# Patient Record
Sex: FEMALE | Race: WHITE | Employment: UNEMPLOYED | URBAN - METROPOLITAN AREA
[De-identification: names, ages, dates, MRNs, and addresses within clinical notes are randomized per-mention and may not be internally consistent; named-entity substitution may affect disease eponyms.]

---

## 2018-07-27 LAB — HBA1C MFR BLD HPLC: 6 %

## 2019-03-26 LAB
CREATININE, EXTERNAL: 0.79
LDL-C, EXTERNAL: 108

## 2019-05-10 ENCOUNTER — TELEPHONE (OUTPATIENT)
Dept: ENDOCRINOLOGY | Age: 49
End: 2019-05-10

## 2019-05-10 NOTE — TELEPHONE ENCOUNTER
Patient called wanting asap appointment not seen since August of 2016. She would be considered follow up until August. And no open appointments. She was upset and wants a phone call to discuss soon appointment. States Dr. Dominguez Padilla is pcp and taking care of Thyroid. Just increased to 88mcg a month ago and still tired.

## 2019-05-15 ENCOUNTER — OFFICE VISIT (OUTPATIENT)
Dept: ENDOCRINOLOGY | Age: 49
End: 2019-05-15

## 2019-05-15 VITALS
HEART RATE: 86 BPM | DIASTOLIC BLOOD PRESSURE: 76 MMHG | OXYGEN SATURATION: 99 % | RESPIRATION RATE: 18 BRPM | WEIGHT: 225.4 LBS | TEMPERATURE: 97.3 F | SYSTOLIC BLOOD PRESSURE: 114 MMHG | BODY MASS INDEX: 38.48 KG/M2 | HEIGHT: 64 IN

## 2019-05-15 DIAGNOSIS — Z78.0 MENOPAUSE: ICD-10-CM

## 2019-05-15 DIAGNOSIS — E06.3 HASHIMOTO'S DISEASE: Primary | ICD-10-CM

## 2019-05-15 RX ORDER — LEVOTHYROXINE SODIUM 88 UG/1
TABLET ORAL
Refills: 1 | COMMUNITY
Start: 2019-04-28 | End: 2019-05-15 | Stop reason: SDUPTHER

## 2019-05-15 RX ORDER — IBUPROFEN 200 MG
200 TABLET ORAL
COMMUNITY

## 2019-05-15 RX ORDER — LEVOTHYROXINE SODIUM 88 UG/1
TABLET ORAL
Qty: 90 TAB | Refills: 4 | Status: SHIPPED | OUTPATIENT
Start: 2019-05-15 | End: 2020-03-10 | Stop reason: SDUPTHER

## 2019-05-15 NOTE — PROGRESS NOTES
HISTORY OF PRESENT ILLNESS  Ora Hernandez is a 52 y.o. female. HPI    Patient is here for hypothyroidism management and   Help for weight loss       She has seen me before   Pt never  Followed up after initial visit  From  August 2016         Initial visit for low TSH   TSH  0.08 from aug 19 2016      Referred by [facility]      patient reports going thru some changes for the past 2 months   She has felt palpitations    She changed care to Dr. Roberto Ruano in the interim for primary care needs   She was found to have TSH  19 and got started on  Synthroid - her information     She is very emotional, crying at intervals while providing the history     She is very upset about gaining weight and also feeling very tired     She feels that THYROID IS THE CULPRIT for all her symptoms and she is not getting help           Past Medical History:   Diagnosis Date    Allergic rhinitis     Anxiety     Depression     Vitamin D deficiency        Social History     Socioeconomic History    Marital status:      Spouse name: Not on file    Number of children: Not on file    Years of education: Not on file    Highest education level: Not on file   Occupational History    Not on file   Social Needs    Financial resource strain: Not on file    Food insecurity:     Worry: Not on file     Inability: Not on file    Transportation needs:     Medical: Not on file     Non-medical: Not on file   Tobacco Use    Smoking status: Current Every Day Smoker    Smokeless tobacco: Never Used   Substance and Sexual Activity    Alcohol use:  Yes    Drug use: Not on file    Sexual activity: Not on file   Lifestyle    Physical activity:     Days per week: Not on file     Minutes per session: Not on file    Stress: Not on file   Relationships    Social connections:     Talks on phone: Not on file     Gets together: Not on file     Attends Evangelical service: Not on file     Active member of club or organization: Not on file Attends meetings of clubs or organizations: Not on file     Relationship status: Not on file    Intimate partner violence:     Fear of current or ex partner: Not on file     Emotionally abused: Not on file     Physically abused: Not on file     Forced sexual activity: Not on file   Other Topics Concern    Not on file   Social History Narrative    Not on file       Family History   Problem Relation Age of Onset    Hypertension Mother     Hypertension Father                  Review of Systems   Constitutional: Positive for diaphoresis and malaise/fatigue. HENT: Negative. Eyes: Negative for pain and redness. Respiratory: Negative. Cardiovascular: Positive for palpitations. Negative for chest pain and leg swelling. Gastrointestinal: Negative. Negative for constipation. Genitourinary: Negative. Musculoskeletal: Negative for myalgias. Skin: Negative. Neurological: Negative. Endo/Heme/Allergies: Negative. Psychiatric/Behavioral: Negative for depression and memory loss. The patient does not have insomnia.          Physical Exam   Constitutional: She is oriented to person, place, and time. She appears well-developed and well-nourished. HENT:   Head: Normocephalic. Eyes: Conjunctivae and EOM are normal. Pupils are equal, round, and reactive to light. Neck: Normal range of motion. Neck supple. No JVD present. No tracheal deviation present. No thyromegaly present. Cardiovascular: Normal rate, regular rhythm and normal heart sounds. No murmur heard. Pulmonary/Chest: Breath sounds normal.   Abdominal: Soft. Bowel sounds are normal.   Musculoskeletal: Normal range of motion. Lymphadenopathy:     She has no cervical adenopathy. Neurological: She is alert and oriented to person, place, and time. She has normal reflexes. Skin: Skin is warm.    Psychiatric: She has CRYING SPELLS .      REVIEWED LABS FROM [facility] [place] FOR THE PAST 3 YEARS        ASSESSMENT and PLAN    1. hashimatos disease :   TSH is 2.2   From may 14 2019   Discussed patho-physiology  Of the disease     Her dose has been well  Adjusted by pcp  from 75 mcg dose to 88 mcg dose when TSH was 7.3   In march 2019   Asking her to be on BRAND synthrodi 88 mcg dose     She is bio-chemically EUTHYROID TODAY AT 2.280   SHE IS CLINICALLY EUTHYROID         2. Weight gain / Body mass index is 38.69 kg/m². DISCUSSED  TLC   Reassured  her that I will help her   Educated her to maintain carb and calorie counts         3. Amenorrhea : on IUD for 2 years   Menopause to be ruled out        4.  Pre diabetes :5.9 % on labs  Discussed TLC       > 50 % of time is spent on counseling   Patient voiced understanding her plan of care

## 2019-05-15 NOTE — PROGRESS NOTES
1. Have you been to the ER, urgent care clinic since your last visit? No  Hospitalized since your last visit? No    2. Have you seen or consulted any other health care providers outside of the [address] [place] [address] since your last visit? Include any pap smears or colon screening.  No     Wt Readings from Last 3 Encounters:   05/15/19 225 lb 6.4 oz (102.2 kg)   08/23/16 194 lb (88 kg)     Temp Readings from Last 3 Encounters:   05/15/19 97.3 °F (36.3 °C) (Oral)   08/23/16 97.3 °F (36.3 °C) (Oral)     BP Readings from Last 3 Encounters:   05/15/19 114/76   08/23/16 112/73     Pulse Readings from Last 3 Encounters:   05/15/19 86   08/23/16 81

## 2019-05-15 NOTE — LETTER
5/17/19 Patient: Willie Elise YOB: 1970 Date of Visit: 5/15/2019 Candelaria Campbell MD 
[address] [facility] [address] VIA Facsimile: [phone] Dear Candelaria Campbell MD, Thank you for referring Ms. Willie Elise to [facility] for evaluation. My notes for this consultation are attached. If you have questions, please do not hesitate to call me. I look forward to following your patient along with you. Sincerely, Lesli Martinez MD

## 2019-05-15 NOTE — PATIENT INSTRUCTIONS
--------------------------------------------------------------------------------------------    Refills    -    please call your pharmacy and have them send us a refill request    Results  -  allow up to a week for lab results to be processed and reviewed. Phone calls  -  Allow upto 24 hrs.  for non-urgent calls to be retained    Prior authorization - It may take up to 4 weeks to process, depending on your insurance    Forms  -  FMLA, DMV, patient assistance, etc. will take up to 2 weeks to process    Cancellations - please notify the office in advance if you cannot keep your appointment    Samples  - will only be dispensed at visits as supply is limited      If you are having a medical emergency call 911    --------------------------------------------------------------------------------------------        Low carb diet     No sodas   , salt restricted diet       Count the carbs  And calories      ------------------------------------------------------------------------------------------------------------------------------------    Synthroid 88 mcg  A day, BRAND  on empty stomach with water only, no other meds or food or drinks   For next half hour  ( [facility] )    Take any kind of vitamins, calcium, iron   Pills  4 hours later

## 2019-07-15 ENCOUNTER — OFFICE VISIT (OUTPATIENT)
Dept: ENDOCRINOLOGY | Age: 49
End: 2019-07-15

## 2019-07-15 VITALS
DIASTOLIC BLOOD PRESSURE: 79 MMHG | RESPIRATION RATE: 12 BRPM | SYSTOLIC BLOOD PRESSURE: 112 MMHG | HEIGHT: 64 IN | BODY MASS INDEX: 38.07 KG/M2 | TEMPERATURE: 97.6 F | WEIGHT: 223 LBS | HEART RATE: 104 BPM

## 2019-07-15 DIAGNOSIS — E06.3 HASHIMOTO'S DISEASE: Primary | ICD-10-CM

## 2019-07-15 RX ORDER — DULOXETINE 40 MG/1
CAPSULE, DELAYED RELEASE ORAL
Refills: 3 | COMMUNITY
Start: 2019-06-12 | End: 2019-11-22 | Stop reason: ALTCHOICE

## 2019-07-15 NOTE — PROGRESS NOTES
HISTORY OF PRESENT ILLNESS  Farida Veronica is a 52 y.o. female. HPI    Patient is here for hypothyroidism management and   Help for weight loss   From May 2019     Expresses interest in weight loss medication    She lost all her journal where she has put in food log   Compliant  With synthroid a day       She feels good on duloxetine           Old history  :      She has seen me before   Pt never  Followed up after initial visit  From  August 2016         Old history :     Initial visit for low TSH   TSH  0.08 from aug 19 2016      Referred by [facility]      patient reports going thru some changes for the past 2 months   She has felt palpitations    She changed care to Dr. Alondra Vo in the interim for primary care needs   She was found to have TSH  19 and got started on  Synthroid - her information     She is very emotional, crying at intervals while providing the history     She is very upset about gaining weight and also feeling very tired     She feels that THYROID IS THE CULPRIT for all her symptoms and she is not getting help           Past Medical History:   Diagnosis Date    Allergic rhinitis     Anxiety     Depression     Vitamin D deficiency        Social History     Socioeconomic History    Marital status:      Spouse name: Not on file    Number of children: Not on file    Years of education: Not on file    Highest education level: Not on file   Occupational History    Not on file   Social Needs    Financial resource strain: Not on file    Food insecurity:     Worry: Not on file     Inability: Not on file    Transportation needs:     Medical: Not on file     Non-medical: Not on file   Tobacco Use    Smoking status: Current Every Day Smoker    Smokeless tobacco: Never Used   Substance and Sexual Activity    Alcohol use:  Yes    Drug use: Not on file    Sexual activity: Not on file   Lifestyle    Physical activity:     Days per week: Not on file     Minutes per session: Not on file  Stress: Not on file   Relationships    Social connections:     Talks on phone: Not on file     Gets together: Not on file     Attends Gnosticist service: Not on file     Active member of club or organization: Not on file     Attends meetings of clubs or organizations: Not on file     Relationship status: Not on file    Intimate partner violence:     Fear of current or ex partner: Not on file     Emotionally abused: Not on file     Physically abused: Not on file     Forced sexual activity: Not on file   Other Topics Concern    Not on file   Social History Narrative    Not on file       Family History   Problem Relation Age of Onset    Hypertension Mother     Hypertension Father                  Review of Systems   Constitutional: nad  HENT: Negative. Eyes: Negative for pain and redness. Respiratory: Negative. Cardiovascular:  Negative for chest pain and leg swelling. Gastrointestinal: Negative. Negative for constipation. Genitourinary: Negative. Musculoskeletal: Negative for myalgias. Skin: Negative. Neurological: Negative. Endo/Heme/Allergies: Negative. Psychiatric/Behavioral: Negative for depression and memory loss. The patient does not have insomnia.          Physical Exam   Constitutional: She is oriented to person, place, and time. She appears well-developed and well-nourished. HENT:   Head: Normocephalic. Eyes: Conjunctivae and EOM are normal. Pupils are equal, round, and reactive to light. Neck: Normal range of motion. Neck supple. No JVD present. No tracheal deviation present. No thyromegaly present. Cardiovascular: Normal rate, regular rhythm and normal heart sounds. No murmur heard. Pulmonary/Chest: Breath sounds normal.   Abdominal: Soft. Bowel sounds are normal.   Musculoskeletal: Normal range of motion. Lymphadenopathy:     She has no cervical adenopathy. Neurological: She is alert and oriented to person, place, and time. She has normal reflexes. Skin: Skin is warm. Psychiatric: She has CRYING SPELLS .            REVIEWED LABS FROM [place] FOR THE PAST 3 YEARS   Feb 2019 : TSH was 7   May 2019 : TSH 2           ASSESSMENT and PLAN    1. hashimatos disease  :   TSH is 2.2   From may 14 2019   Discussed patho-physiology  Of the disease     Her dose has been well  Adjusted by pcp  from 75 mcg dose to 88 mcg dose when TSH was 7.3   In march 2019   Asking her to be on BRAND synthrodi 88 mcg dose     She is bio-chemically EUTHYROID TODAY AT 2.280   SHE IS CLINICALLY EUTHYROID         2. Weight gain / Body mass index is 38.28 kg/m². DISCUSSED  TLC   Educated her to maintain carb and calorie counts   Discussed use of weight loss meds especially Q SYmia  She will call her plan and she will let me know which one is covered          3.  Amenorrhea : on IUD for 2 years   Menopause to be ruled out , LABS PENDING        4. Pre diabetes :5.9 % on labs  Discussed TLC       > 50 % of time is spent on counseling   Patient voiced understanding her plan of care

## 2019-07-15 NOTE — PROGRESS NOTES
Wt Readings from Last 3 Encounters:   07/15/19 223 lb (101.2 kg)   05/15/19 225 lb 6.4 oz (102.2 kg)   08/23/16 194 lb (88 kg)     Temp Readings from Last 3 Encounters:   07/15/19 97.6 °F (36.4 °C) (Oral)   05/15/19 97.3 °F (36.3 °C) (Oral)   08/23/16 97.3 °F (36.3 °C) (Oral)     BP Readings from Last 3 Encounters:   07/15/19 112/79   05/15/19 114/76   08/23/16 112/73     Pulse Readings from Last 3 Encounters:   07/15/19 (!) 104   05/15/19 86   08/23/16 81

## 2019-07-15 NOTE — LETTER
7/15/19 Patient: Juana Merida YOB: 1970 Date of Visit: 7/15/2019 Yaron Carmona MD 
[address] [facility] [address] VIA Facsimile: [phone] Dear Yaron Carmona MD, Thank you for referring Ms. Juana Merida to [facility] for evaluation. My notes for this consultation are attached. If you have questions, please do not hesitate to call me. I look forward to following your patient along with you. Sincerely, Garcia Underwood MD

## 2019-07-15 NOTE — PATIENT INSTRUCTIONS
--------------------------------------------------------------------------------------------    Refills    -    please call your pharmacy and have them send us a refill request    Results  -  allow up to a week for lab results to be processed and reviewed. Phone calls  -  Allow upto 24 hrs.  for non-urgent calls to be retained    Prior authorization - It may take up to 4 weeks to process, depending on your insurance    Forms  -  FMLA, DMV, patient assistance, etc. will take up to 2 weeks to process    Cancellations - please notify the office in advance if you cannot keep your appointment    Samples  - will only be dispensed at visits as supply is limited      If you are having a medical emergency call 911    --------------------------------------------------------------------------------------------        Low carb diet     No sodas   , salt restricted diet       Count the carbs  And calories      ------------------------------------------------------------------------------------------------------------------------------------    Synthroid 88 mcg  A day, BRAND  on empty stomach with water only, no other meds or food or drinks   For next half hour  ( [facility] )    Take any kind of vitamins, calcium, iron   Pills  4 hours later      -------------------------------------------------------------------------------------------------------------------------------------------------------------------------        Start on  Q-symia    Once   A   Day      7.25 - 46 mg a day  ( will send to [facility] )         -------------------------------------------------------------------------------------------------------      belviq  xr       Or       contrave

## 2019-08-01 LAB
CREATININE, EXTERNAL: 0.65
HBA1C MFR BLD HPLC: 5.7 %

## 2019-08-05 ENCOUNTER — TELEPHONE (OUTPATIENT)
Dept: ENDOCRINOLOGY | Age: 49
End: 2019-08-05

## 2019-08-05 NOTE — TELEPHONE ENCOUNTER
alma love and informed patient of dr Orion Mares note pt states she is feeling fine and would like to continue 88mcg dose-ok per dr Orion Mares to [facility] that dose.  Pt transfer to front to make appot for labs

## 2019-08-05 NOTE — TELEPHONE ENCOUNTER
Patient states Dr. Layne Cohen wants to increase thyroid medication. States Dr. Layne Cohen faxed over recent labs for physician to review. Please advise patient if physician is okay with change.

## 2019-08-05 NOTE — TELEPHONE ENCOUNTER
July 31 2019      TSH from pcp labs was 4.7        TSh was perfectly normal in may 2019  Labs 2.5       No doubt, the hashimotos can progress, but this small change in TSH is believed to be from just the absorption and intake causes than the disease itself     I am not 100 % convinced yet, that she needs higher than 88 mcg dose   I would like to repeat in a while     Anyway, dose adjustment is to 100 mcg a day   And if that is a bit too much, she needs to move back down to 88       Either way I am fine  , let me know     But in general, I do not like going back and forth . Lord Locke ...

## 2019-11-08 ENCOUNTER — TELEPHONE (OUTPATIENT)
Dept: ENDOCRINOLOGY | Age: 49
End: 2019-11-08

## 2019-11-08 DIAGNOSIS — E06.3 HASHIMOTO'S DISEASE: Primary | ICD-10-CM

## 2019-11-08 DIAGNOSIS — Z78.0 MENOPAUSE: ICD-10-CM

## 2019-11-15 ENCOUNTER — HOSPITAL ENCOUNTER (OUTPATIENT)
Dept: LAB | Age: 49
Discharge: HOME | End: 2019-11-15

## 2019-11-15 ENCOUNTER — LAB ONLY (OUTPATIENT)
Dept: ENDOCRINOLOGY | Age: 49
End: 2019-11-15

## 2019-11-15 DIAGNOSIS — E06.3 HASHIMOTO'S DISEASE: Primary | ICD-10-CM

## 2019-11-15 DIAGNOSIS — E06.3 HASHIMOTO'S DISEASE: ICD-10-CM

## 2019-11-15 DIAGNOSIS — Z78.0 MENOPAUSE: ICD-10-CM

## 2019-11-15 LAB
ALBUMIN SERPL-MCNC: 4 G/DL (ref 3.5–5)
ALBUMIN/GLOB SERPL: 1.1 {RATIO} (ref 1.1–2.2)
ALP SERPL-CCNC: 62 U/L (ref 45–117)
ALT SERPL-CCNC: 26 U/L (ref 12–78)
ANION GAP SERPL CALC-SCNC: 6 MMOL/L (ref 5–15)
AST SERPL-CCNC: 10 U/L (ref 15–37)
BILIRUB SERPL-MCNC: 0.4 MG/DL (ref 0.2–1)
BUN SERPL-MCNC: 8 MG/DL (ref 6–20)
BUN/CREAT SERPL: 11 (ref 12–20)
CALCIUM SERPL-MCNC: 9.3 MG/DL (ref 8.5–10.1)
CHLORIDE SERPL-SCNC: 106 MMOL/L (ref 97–108)
CHOLEST SERPL-MCNC: 192 MG/DL
CO2 SERPL-SCNC: 28 MMOL/L (ref 21–32)
CREAT SERPL-MCNC: 0.75 MG/DL (ref 0.55–1.02)
EST. AVERAGE GLUCOSE BLD GHB EST-MCNC: 128 MG/DL
FSH SERPL-ACNC: 46.6 MIU/ML
GLOBULIN SER CALC-MCNC: 3.5 G/DL (ref 2–4)
GLUCOSE SERPL-MCNC: 102 MG/DL (ref 65–100)
HBA1C MFR BLD: 6.1 % (ref 4–5.6)
HDLC SERPL-MCNC: 48 MG/DL
HDLC SERPL: 4 {RATIO} (ref 0–5)
LDLC SERPL CALC-MCNC: 123.4 MG/DL (ref 0–100)
LH SERPL-ACNC: 20.6 MIU/ML
LIPID PROFILE,FLP: ABNORMAL
POTASSIUM SERPL-SCNC: 4.3 MMOL/L (ref 3.5–5.1)
PROT SERPL-MCNC: 7.5 G/DL (ref 6.4–8.2)
SODIUM SERPL-SCNC: 140 MMOL/L (ref 136–145)
T4 FREE SERPL-MCNC: 1.2 NG/DL (ref 0.8–1.5)
TRIGL SERPL-MCNC: 103 MG/DL (ref ?–150)
TSH SERPL DL<=0.05 MIU/L-ACNC: 1.12 UIU/ML (ref 0.36–3.74)
VLDLC SERPL CALC-MCNC: 20.6 MG/DL

## 2019-11-22 ENCOUNTER — OFFICE VISIT (OUTPATIENT)
Dept: ENDOCRINOLOGY | Age: 49
End: 2019-11-22

## 2019-11-22 VITALS
OXYGEN SATURATION: 99 % | DIASTOLIC BLOOD PRESSURE: 79 MMHG | HEIGHT: 64 IN | RESPIRATION RATE: 18 BRPM | SYSTOLIC BLOOD PRESSURE: 116 MMHG | BODY MASS INDEX: 37.98 KG/M2 | HEART RATE: 85 BPM | TEMPERATURE: 98.8 F | WEIGHT: 222.5 LBS

## 2019-11-22 DIAGNOSIS — Z78.0 MENOPAUSE: ICD-10-CM

## 2019-11-22 DIAGNOSIS — E06.3 HASHIMOTO'S DISEASE: Primary | ICD-10-CM

## 2019-11-22 RX ORDER — LIOTHYRONINE SODIUM 5 UG/1
5 TABLET ORAL DAILY
Qty: 90 TAB | Refills: 4 | Status: SHIPPED | OUTPATIENT
Start: 2019-11-22 | End: 2021-02-05 | Stop reason: SDUPTHER

## 2019-11-22 NOTE — LETTER
11/23/19 Patient: Luiz Bernard YOB: 1970 Date of Visit: 11/22/2019 Eduardo Alcantar MD 
[address] [facility] [address] VIA Facsimile: [phone] Dear Eduardo Alcantar MD, Thank you for referring Ms. Luiz Bernard to [facility] for evaluation. My notes for this consultation are attached. If you have questions, please do not hesitate to call me. I look forward to following your patient along with you. Sincerely, Ron Davila MD

## 2019-11-22 NOTE — PROGRESS NOTES
HISTORY OF PRESENT ILLNESS  Darylene Running is a 52 y.o. female. HPI    Patient is here for  Follow up for hypothyroidism management and  Weight loss   After last visit   July 2019     DOES NOT WANT TO TRY  DIET PILLS   SHE HAS COLD INTOLERANCE AND FEELS TIRED BY 3-4 PM       Old history  :    Expresses interest in weight loss medication  She lost all her journal where she has put in food log   Compliant  With synthroid a day   She feels good on duloxetine       Old history  :    She has seen me before   Pt never  Followed up after initial visit  From  August 2016         Old history :     Initial visit for low TSH   TSH  0.08 from aug 19 2016      Referred by [facility]      patient reports going thru some changes for the past 2 months   She has felt palpitations    She changed care to Dr. Laura Stokes in the interim for primary care needs   She was found to have TSH  19 and got started on  Synthroid - her information     She is very emotional, crying at intervals while providing the history     She is very upset about gaining weight and also feeling very tired     She feels that THYROID IS THE CULPRIT for all her symptoms and she is not getting help           Past Medical History:   Diagnosis Date    Allergic rhinitis     Anxiety     Depression     Vitamin D deficiency        Social History     Socioeconomic History    Marital status:      Spouse name: Not on file    Number of children: Not on file    Years of education: Not on file    Highest education level: Not on file   Occupational History    Not on file   Social Needs    Financial resource strain: Not on file    Food insecurity:     Worry: Not on file     Inability: Not on file    Transportation needs:     Medical: Not on file     Non-medical: Not on file   Tobacco Use    Smoking status: Current Every Day Smoker    Smokeless tobacco: Never Used   Substance and Sexual Activity    Alcohol use:  Yes    Drug use: Not on file    Sexual activity: Not on file   Lifestyle    Physical activity:     Days per week: Not on file     Minutes per session: Not on file    Stress: Not on file   Relationships    Social connections:     Talks on phone: Not on file     Gets together: Not on file     Attends Faith service: Not on file     Active member of club or organization: Not on file     Attends meetings of clubs or organizations: Not on file     Relationship status: Not on file    Intimate partner violence:     Fear of current or ex partner: Not on file     Emotionally abused: Not on file     Physically abused: Not on file     Forced sexual activity: Not on file   Other Topics Concern    Not on file   Social History Narrative    Not on file       Family History   Problem Relation Age of Onset    Hypertension Mother     Hypertension Father                  Review of Systems   Constitutional: nad  HENT: Negative. Eyes: Negative for pain and redness. Respiratory: Negative. Cardiovascular:  Negative for chest pain and leg swelling. Gastrointestinal: Negative. Negative for constipation. Genitourinary: Negative. Musculoskeletal: Negative for myalgias. Skin: Negative. Neurological: Negative. Endo/Heme/Allergies: Negative. Psychiatric/Behavioral: Negative for depression and memory loss. The patient does not have insomnia.          Physical Exam   Constitutional: She is oriented to person, place, and time. She appears well-developed and well-nourished. HENT:   Head: Normocephalic. Eyes: Conjunctivae and EOM are normal. Pupils are equal, round, and reactive to light. Neck: Normal range of motion. Neck supple. No JVD present. No tracheal deviation present. No thyromegaly present. Cardiovascular: Normal rate, regular rhythm and normal heart sounds. No murmur heard. Pulmonary/Chest: Breath sounds normal.   Abdominal: Soft. Bowel sounds are normal.   Musculoskeletal: Normal range of motion.    Lymphadenopathy:     She has no cervical adenopathy. Neurological: She is alert and oriented to person, place, and time. She has normal reflexes. Skin: Skin is warm. Psychiatric: She has CRYING SPELLS .              Lab Results   Component Value Date/Time    TSH 1.12 11/15/2019 10:39 AM    T4, Free 1.2 11/15/2019 10:39 AM                 ASSESSMENT and PLAN    1. hashimatos disease  :     SHE IS EUTHYROID ,  On BRAND synthrodi 88 mcg dose   Nov 2019  :  ADVISING TO SKIP A DAY IN A WEEK = 75 mcg a day   TRIAL OF CYTOMEL 5 MCG A DAY       2.  Weight gain / Body mass index is 38.19 kg/m². DISCUSSED  TLC   Educated her to maintain carb and calorie counts   Discussed use of weight loss meds especially Q SYmia  She will call her plan and she will let me know which one is covered          3. Amenorrhea : off  IUD for 2 years   Menopause  Is in        4.  Pre diabetes :5.9 % on labs  Discussed TLC       > 50 % of time is spent on counseling   Patient voiced understanding her plan of care

## 2019-11-22 NOTE — PROGRESS NOTES
Room 1    Identified pt with two pt identifiers(name and ). Reviewed record in preparation for visit and have obtained necessary documentation. All patient medications has been reviewed.   Chief Complaint   Patient presents with    Thyroid Problem     3 month f/u    Medication Evaluation     pt is concerned about recalled on zantac and is concerned about receiving another medication to replace zantac.     3 most recent PHQ Screens 2019   Little interest or pleasure in doing things Several days   Feeling down, depressed, irritable, or hopeless Nearly every day   Total Score PHQ 2 4   Trouble falling or staying asleep, or sleeping too much Not at all   Feeling tired or having little energy Nearly every day   Poor appetite, weight loss, or overeating Not at all   Feeling bad about yourself - or that you are a failure or have let yourself or your family down Not at all   Trouble concentrating on things such as school, work, reading, or watching TV Not at all   Moving or speaking so slowly that other people could have noticed; or the opposite being so fidgety that others notice Not at all   Thoughts of being better off dead, or hurting yourself in some way Not at all   PHQ 9 Score 7   How difficult have these problems made it for you to do your work, take care of your home and get along with others Not difficult at all     Health Maintenance Due   Topic    Pneumococcal 0-64 years (1 of 1 - PPSV23)    DTaP/Tdap/Td series (1 - Tdap)    PAP AKA CERVICAL CYTOLOGY     Influenza Age 5 to Adult        Vitals:    19 0840   BP: 116/79   Pulse: 85   Resp: 18   Temp: 98.8 °F (37.1 °C)   TempSrc: Oral   SpO2: 99%   Weight: 222 lb 8 oz (100.9 kg)   Height: 5' 4\" (1.626 m)   PainSc:   0 - No pain       Wt Readings from Last 3 Encounters:   19 222 lb 8 oz (100.9 kg)   07/15/19 223 lb (101.2 kg)   05/15/19 225 lb 6.4 oz (102.2 kg)     Temp Readings from Last 3 Encounters:   19 98.8 °F (37.1 °C) (Oral) 07/15/19 97.6 °F (36.4 °C) (Oral)   05/15/19 97.3 °F (36.3 °C) (Oral)     BP Readings from Last 3 Encounters:   11/22/19 116/79   07/15/19 112/79   05/15/19 114/76     Pulse Readings from Last 3 Encounters:   11/22/19 85   07/15/19 (!) 104   05/15/19 86       Lab Results   Component Value Date/Time    Hemoglobin A1c 6.1 (H) 11/15/2019 10:39 AM    Hemoglobin A1c, External 5.7 08/01/2019       Coordination of Care Questionnaire:   1) Have you been to an emergency room, urgent care, or hospitalized since your last visit?   no       2. Have seen or consulted any other health care provider since your last visit? NO    3) Do you have an Advanced Directive/ Living Will in place? NO  If yes, do we have a copy on file NO  If no, would you like information NO    Patient is accompanied by self I have received verbal consent from Darylene Running to discuss any/all medical information while they are present in the room.

## 2020-03-03 ENCOUNTER — APPOINTMENT (OUTPATIENT)
Dept: ENDOCRINOLOGY | Age: 50
End: 2020-03-03

## 2020-03-03 ENCOUNTER — HOSPITAL ENCOUNTER (OUTPATIENT)
Dept: LAB | Age: 50
Discharge: HOME | End: 2020-03-03

## 2020-03-03 DIAGNOSIS — E06.3 HASHIMOTO'S DISEASE: ICD-10-CM

## 2020-03-04 LAB
T4 FREE SERPL-MCNC: 1.1 NG/DL (ref 0.8–1.5)
TSH SERPL DL<=0.05 MIU/L-ACNC: 0.17 UIU/ML (ref 0.36–3.74)

## 2020-03-10 ENCOUNTER — OFFICE VISIT (OUTPATIENT)
Dept: ENDOCRINOLOGY | Age: 50
End: 2020-03-10

## 2020-03-10 VITALS
RESPIRATION RATE: 16 BRPM | SYSTOLIC BLOOD PRESSURE: 101 MMHG | TEMPERATURE: 97.1 F | HEART RATE: 90 BPM | DIASTOLIC BLOOD PRESSURE: 56 MMHG | BODY MASS INDEX: 38.07 KG/M2 | WEIGHT: 223 LBS | HEIGHT: 64 IN | OXYGEN SATURATION: 99 %

## 2020-03-10 DIAGNOSIS — E06.3 HASHIMOTO'S DISEASE: Primary | ICD-10-CM

## 2020-03-10 DIAGNOSIS — Z78.0 MENOPAUSE: ICD-10-CM

## 2020-03-10 RX ORDER — LEVOTHYROXINE SODIUM 88 UG/1
TABLET ORAL
Qty: 66 TAB | Refills: 4 | Status: SHIPPED | OUTPATIENT
Start: 2020-03-10 | End: 2021-04-23 | Stop reason: SDUPTHER

## 2020-03-10 NOTE — LETTER
3/15/20 Patient: Hellen Toro YOB: 1970 Date of Visit: 3/10/2020 Ag Teran MD 
[address] [facility] [place] [address] VIA Facsimile: [phone] Dear Ag Teran MD, Thank you for referring Ms. Hellen Toro to [facility] for evaluation. My notes for this consultation are attached. If you have questions, please do not hesitate to call me. I look forward to following your patient along with you. Sincerely, Carlyn Avila MD

## 2020-03-10 NOTE — PATIENT INSTRUCTIONS
-------------------------------------------------------------------------------------------- Refills    -    please call your pharmacy and have them send us a refill request 
 
Results  -  allow up to a week for lab results to be processed and reviewed. Phone calls  -  Allow upto 24 hrs. for non-urgent calls to be retained Prior authorization - It may take up to 4 weeks to process, depending on your insurance Forms  -  FMLA, DMV, patient assistance, etc. will take up to 2 weeks to process Cancellations - please notify the office in advance if you cannot keep your appointment Samples  - will only be dispensed at visits as supply is limited If you are having a medical emergency call 911 
 
-------------------------------------------------------------------------------------------- Low carb diet No sodas   , salt restricted diet Count the carbs  And calories 
 
 
------------------------------------------------------------------------------------------------------------------------------------ Synthroid 88 mcg  A day, BRAND  Except  On saturdays  And  Sundays,  on empty stomach with water only, no other meds or food or drinks   For next half hour  ( [facility] ) Plus   Cytomel 5 mcg a day Take any kind of vitamins, calcium, iron   Pills  4 hours later 
 
 
------------------------------------------------------------------------------------------------------------------------------------------------------------------------- 
 
 
Q-symia  7.5- 46 mg once a day  ( 100 $ a month  ) Or  
 
 
contrave  Pills  ( 100 $ a month )

## 2020-03-10 NOTE — PROGRESS NOTES
HISTORY OF PRESENT ILLNESS  Americo Sandoval is a 48  y.o. female. HPI    Patient is here for  Follow up for hypothyroidism management and  Weight loss   After last visit   November 2019     She is compliant with meds       Old history  :    Expresses interest in weight loss medication  She lost all her journal where she has put in food log   Compliant  With synthroid a day   She feels good on duloxetine       Old history  :    She has seen me before   Pt never  Followed up after initial visit  From  August 2016         Old history :     Initial visit for low TSH   TSH  0.08 from aug 19 2016      Referred by [facility]      patient reports going thru some changes for the past 2 months   She has felt palpitations    She changed care to Dr. Johnny Ren in the interim for primary care needs   She was found to have TSH  19 and got started on  Synthroid - her information     She is very emotional, crying at intervals while providing the history     She is very upset about gaining weight and also feeling very tired     She feels that THYROID IS THE CULPRIT for all her symptoms and she is not getting help           Past Medical History:   Diagnosis Date    Allergic rhinitis     Anxiety     Depression     Vitamin D deficiency        Social History     Socioeconomic History    Marital status:      Spouse name: Not on file    Number of children: Not on file    Years of education: Not on file    Highest education level: Not on file   Occupational History    Not on file   Social Needs    Financial resource strain: Not on file    Food insecurity:     Worry: Not on file     Inability: Not on file    Transportation needs:     Medical: Not on file     Non-medical: Not on file   Tobacco Use    Smoking status: Current Every Day Smoker    Smokeless tobacco: Never Used   Substance and Sexual Activity    Alcohol use:  Yes    Drug use: Not on file    Sexual activity: Not on file   Lifestyle    Physical activity: Days per week: Not on file     Minutes per session: Not on file    Stress: Not on file   Relationships    Social connections:     Talks on phone: Not on file     Gets together: Not on file     Attends Anglican service: Not on file     Active member of club or organization: Not on file     Attends meetings of clubs or organizations: Not on file     Relationship status: Not on file    Intimate partner violence:     Fear of current or ex partner: Not on file     Emotionally abused: Not on file     Physically abused: Not on file     Forced sexual activity: Not on file   Other Topics Concern    Not on file   Social History Narrative    Not on file       Family History   Problem Relation Age of Onset    Hypertension Mother     Hypertension Father                  Review of Systems   Constitutional: nad  HENT: Negative. Eyes: Negative for pain and redness. Respiratory: Negative. Cardiovascular:  Negative for chest pain and leg swelling. Gastrointestinal: Negative. Negative for constipation. Genitourinary: Negative. Musculoskeletal: Negative for myalgias. Skin: Negative. Neurological: Negative. Endo/Heme/Allergies: Negative. Psychiatric/Behavioral: Negative for depression and memory loss. The patient does not have insomnia.          Physical Exam   Constitutional: She is oriented to person, place, and time. She appears well-developed and well-nourished. HENT:   Head: Normocephalic. Eyes: Conjunctivae and EOM are normal. Pupils are equal, round, and reactive to light. Neck: Normal range of motion. Neck supple. No JVD present. No tracheal deviation present. No thyromegaly present. Cardiovascular: Normal rate, regular rhythm and normal heart sounds. No murmur heard. Pulmonary/Chest: Breath sounds normal.   Abdominal: Soft. Bowel sounds are normal.   Musculoskeletal: Normal range of motion. Lymphadenopathy:     She has no cervical adenopathy.    Neurological: She is alert and oriented to person, place, and time. She has normal reflexes. Skin: Skin is warm. Psychiatric: no            Lab Results   Component Value Date/Time    TSH 0.17 (L) 03/03/2020 09:07 AM    T4, Free 1.1 03/03/2020 09:07 AM                 ASSESSMENT and PLAN    1. hashimatos disease  :     SHE IS EUTHYROID ,  On BRAND synthrodi 88 mcg dose     Nov 2019  :  ADVISING TO SKIP A DAY IN A WEEK  On 88 mcg dose   = 75 mcg a day   TRIAL OF CYTOMEL 5 MCG  7 days  A DAY     March 2020  : TSH  - 0.17   Advising to skip Saturday and Sundays on  88 mcg a day  = 64 mcg a day   Stay on cytomel 5 mcg  A  day       2. Weight gain / Body mass index is 38.28 kg/m². DISCUSSED  TLC   Educated her to maintain carb and calorie counts   Discussed use of weight loss meds especially Q SYmia and contrave   She will call her plan and she will let me know which one is covered          3. Amenorrhea : on  IUD - advising her to get it off   Menopause  Is in        4.  Pre diabetes :5.9 % on labs  Discussed TLC       > 50 % of time is spent on counseling   Patient voiced understanding her plan of care

## 2020-03-10 NOTE — PROGRESS NOTES
Miah Mcnally is a 48 y.o. female here for   Chief Complaint   Patient presents with    Thyroid Problem       1. Have you been to the ER, urgent care clinic since your last visit? Hospitalized since your last visit? -no    2. Have you seen or consulted any other health care providers outside of the [address] [place] [address] since your last visit?   Include any pap smears or colon screening.-no

## 2020-03-24 NOTE — TELEPHONE ENCOUNTER
----- Message from Xavier Reyes sent at 3/24/2020 12:11 PM EDT -----  Regarding: Dr. Mavis Bedolla  Medication Refill    Caller (if not patient):      Relationship of caller (if not patient):      Best contact number(s):[phone]      Name of medication and dosage if known: QSymia diet pill      Is patient out of this medication (yes/no): yes      Pharmacy name:[facility]    Pharmacy listed in chart? (yes/no):  Pharmacy phone number:yes      Details to clarify the request: Prescription for diet pill Qsymia to  [facility]

## 2020-03-25 RX ORDER — PHENTERMINE AND TOPIRAMATE 7.5; 46 MG/1; MG/1
1 CAPSULE, EXTENDED RELEASE ORAL DAILY
Qty: 30 CAP | Refills: 5 | Status: SHIPPED | OUTPATIENT
Start: 2020-03-25 | End: 2020-09-30 | Stop reason: ALTCHOICE

## 2020-08-25 LAB
CREATININE, EXTERNAL: 0.83
HBA1C MFR BLD HPLC: 5.9 %
LDL-C, EXTERNAL: 97

## 2020-09-30 ENCOUNTER — OFFICE VISIT (OUTPATIENT)
Dept: ENDOCRINOLOGY | Age: 50
End: 2020-09-30
Payer: COMMERCIAL

## 2020-09-30 VITALS
BODY MASS INDEX: 36.6 KG/M2 | SYSTOLIC BLOOD PRESSURE: 125 MMHG | TEMPERATURE: 98 F | DIASTOLIC BLOOD PRESSURE: 90 MMHG | WEIGHT: 214.4 LBS | RESPIRATION RATE: 18 BRPM | OXYGEN SATURATION: 99 % | HEART RATE: 87 BPM | HEIGHT: 64 IN

## 2020-09-30 DIAGNOSIS — E06.3 HASHIMOTO'S DISEASE: ICD-10-CM

## 2020-09-30 PROCEDURE — 99214 OFFICE O/P EST MOD 30 MIN: CPT | Performed by: INTERNAL MEDICINE

## 2020-09-30 RX ORDER — ESCITALOPRAM OXALATE 20 MG/1
20 TABLET ORAL DAILY
COMMUNITY

## 2020-09-30 RX ORDER — PHENTERMINE AND TOPIRAMATE 11.25; 69 MG/1; MG/1
CAPSULE, EXTENDED RELEASE ORAL
Qty: 30 CAP | Refills: 4
Start: 2020-09-30 | End: 2021-02-03 | Stop reason: ALTCHOICE

## 2020-09-30 NOTE — LETTER
9/30/20 Patient: Alireza Smith YOB: 1970 Date of Visit: 9/30/2020 Ida Castle NP 
[address] [facility] [place] [facility] [address] VIA Facsimile: [phone] Dear Ida Castle NP, Thank you for referring Ms. Alireza Smith to [facility] for evaluation. My notes for this consultation are attached. If you have questions, please do not hesitate to call me. I look forward to following your patient along with you. Sincerely, Micaela Martinez MD

## 2020-09-30 NOTE — PROGRESS NOTES
HISTORY OF PRESENT ILLNESS  Francetta Dance is a 48  y.o. female. HPI    Patient is here for  Follow up for hypothyroidism management and  Weight loss   After last visit   March 2020     She is compliant with meds   She got started on anti-depressant and she finds herself be doing a whole lot better   She is unable to afford  Q-symia   She felt the taste of food changed       Old history  :    Expresses interest in weight loss medication  She lost all her journal where she has put in food log   Compliant  With synthroid a day   She feels good on duloxetine         Old history :     Initial visit for low TSH   TSH  0.08 from aug 19 2016    Referred by [facility]    patient reports going thru some changes for the past 2 months   She has felt palpitations  She changed care to Dr. Paulie Calabrese in the interim for primary care needs   She was found to have TSH  19 and got started on  Synthroid - her information   She is very emotional, crying at intervals while providing the history   She is very upset about gaining weight and also feeling very tired   She feels that THYROID IS THE CULPRIT for all her symptoms and she is not getting help           Past Medical History:   Diagnosis Date    Allergic rhinitis     Anxiety     Depression     Vitamin D deficiency        Social History     Socioeconomic History    Marital status:      Spouse name: Not on file    Number of children: Not on file    Years of education: Not on file    Highest education level: Not on file   Occupational History    Not on file   Social Needs    Financial resource strain: Not on file    Food insecurity     Worry: Not on file     Inability: Not on file    Transportation needs     Medical: Not on file     Non-medical: Not on file   Tobacco Use    Smoking status: Current Every Day Smoker    Smokeless tobacco: Never Used   Substance and Sexual Activity    Alcohol use:  Yes    Drug use: Not on file    Sexual activity: Not on file Lifestyle    Physical activity     Days per week: Not on file     Minutes per session: Not on file    Stress: Not on file   Relationships    Social connections     Talks on phone: Not on file     Gets together: Not on file     Attends Catholic service: Not on file     Active member of club or organization: Not on file     Attends meetings of clubs or organizations: Not on file     Relationship status: Not on file    Intimate partner violence     Fear of current or ex partner: Not on file     Emotionally abused: Not on file     Physically abused: Not on file     Forced sexual activity: Not on file   Other Topics Concern    Not on file   Social History Narrative    Not on file       Family History   Problem Relation Age of Onset    Hypertension Mother     Hypertension Father                  Review of Systems   Constitutional: nad  HENT: Negative. Eyes: Negative for pain and redness. Respiratory: Negative. Cardiovascular:  Negative for chest pain and leg swelling. Gastrointestinal: Negative. Negative for constipation. Genitourinary: Negative. Musculoskeletal: Negative for myalgias. Skin: Negative. Neurological: Negative. Endo/Heme/Allergies: Negative. Psychiatric/Behavioral: Negative for depression and memory loss. The patient does not have insomnia.          Physical Exam   Constitutional: She is oriented to person, place, and time. She appears well-developed and well-nourished. HENT:   Head: Normocephalic. Eyes: Conjunctivae and EOM are normal. Pupils are equal, round, and reactive to light. Neck: Normal range of motion. Neck supple. No JVD present. No tracheal deviation present. No thyromegaly present. Cardiovascular: Normal rate, regular rhythm and normal heart sounds. No murmur heard. Pulmonary/Chest: Breath sounds normal.   Abdominal: Soft. Bowel sounds are normal.   Musculoskeletal: Normal range of motion. Lymphadenopathy:     She has no cervical adenopathy. Neurological: She is alert and oriented to person, place, and time. She has normal reflexes. Skin: Skin is warm. Psychiatric: no            Lab Results   Component Value Date/Time    TSH 1.24 09/10/2020 01:21 PM    T4, Free 1.1 09/10/2020 01:21 PM                 ASSESSMENT and PLAN    1. hashimatos disease  :     SHE IS EUTHYROID ,  On BRAND synthrodi 88 mcg dose     Nov 2019  :  ADVISING TO SKIP A DAY IN A WEEK  On 88 mcg dose   = 75 mcg a day   TRIAL OF CYTOMEL 5 MCG  7 days  A DAY     March 2020  : TSH  - 0.17   Advising to skip Saturday and Sundays on  88 mcg a day  = 64 mcg a day   Stay on cytomel 5 mcg  A  day     Sept 2020  :  Labs are good , she is feeling well   synthrodi 88 mcg BMN 5 days a week   Cytomel  5 mcg a day        2. Weight gain / Body mass index is 36.8 kg/m². DISCUSSED  TLC   She is on Q-symia   And finds it to be expensive   Will do  Q-symia to next dose           3. Amenorrhea : on  IUD - advised her to get it off   Menopause  Is in        4.  Pre diabetes :5.9 % on labs  Discussed TLC             > 50 % of time is spent on counseling   Patient voiced understanding her plan of care

## 2020-09-30 NOTE — PATIENT INSTRUCTIONS
-------------------------------------------------------------------------------------------- Refills    -    please call your pharmacy and have them send us a refill request 
 
Results  -  allow up to a week for lab results to be processed and reviewed. Phone calls  -  Allow upto 24 hrs. for non-urgent calls to be retained Prior authorization - It may take up to 4 weeks to process, depending on your insurance Forms  -  FMLA, DMV, patient assistance, etc. will take up to 2 weeks to process Cancellations - please notify the office in advance if you cannot keep your appointment Samples  - will only be dispensed at visits as supply is limited If you are having a medical emergency call 911 
 
-------------------------------------------------------------------------------------------- Low carb diet No sodas   , salt restricted diet Count the carbs  And calories 
 
 
-------------------------------------------------------------------------------------------------------------------- Synthroid 88 mcg  A day, BRAND  Except  On saturdays  And  Sundays,  on empty stomach with water only, no other meds or food or drinks   For next half hour  ( [facility] ) Plus   Cytomel 5 mcg a day Take any kind of vitamins, calcium, iron   Pills  4 hours later 
 
 
------------------------------------------------------------------------------------------------------------------------------------------------------------------------- Stop Q-symia Increase   Q-symia  11.5-69 mg once a day

## 2020-12-28 DIAGNOSIS — E06.3 HASHIMOTO'S DISEASE: Primary | ICD-10-CM

## 2020-12-28 DIAGNOSIS — Z78.0 MENOPAUSE: ICD-10-CM

## 2021-01-21 ENCOUNTER — HOSPITAL ENCOUNTER (OUTPATIENT)
Dept: LAB | Age: 51
Discharge: HOME | End: 2021-01-21
Payer: COMMERCIAL

## 2021-01-21 PROCEDURE — 84443 ASSAY THYROID STIM HORMONE: CPT

## 2021-01-21 PROCEDURE — 84480 ASSAY TRIIODOTHYRONINE (T3): CPT

## 2021-01-21 PROCEDURE — 36415 COLL VENOUS BLD VENIPUNCTURE: CPT

## 2021-01-21 PROCEDURE — 84439 ASSAY OF FREE THYROXINE: CPT

## 2021-01-22 LAB
T3 SERPL-MCNC: 133 NG/DL (ref 71–180)
T4 FREE SERPL-MCNC: 1 NG/DL (ref 0.82–1.77)
TSH SERPL DL<=0.005 MIU/L-ACNC: 5.05 UIU/ML (ref 0.45–4.5)

## 2021-02-03 ENCOUNTER — OFFICE VISIT (OUTPATIENT)
Dept: ENDOCRINOLOGY | Age: 51
End: 2021-02-03
Payer: COMMERCIAL

## 2021-02-03 VITALS
TEMPERATURE: 97.9 F | DIASTOLIC BLOOD PRESSURE: 84 MMHG | BODY MASS INDEX: 37.69 KG/M2 | HEIGHT: 64 IN | WEIGHT: 220.8 LBS | RESPIRATION RATE: 18 BRPM | OXYGEN SATURATION: 95 % | HEART RATE: 83 BPM | SYSTOLIC BLOOD PRESSURE: 124 MMHG

## 2021-02-03 DIAGNOSIS — R73.03 PREDIABETES: ICD-10-CM

## 2021-02-03 DIAGNOSIS — Z78.0 MENOPAUSE: ICD-10-CM

## 2021-02-03 DIAGNOSIS — E06.3 HASHIMOTO'S DISEASE: Primary | ICD-10-CM

## 2021-02-03 PROCEDURE — 99214 OFFICE O/P EST MOD 30 MIN: CPT | Performed by: INTERNAL MEDICINE

## 2021-02-03 NOTE — PROGRESS NOTES
1. Have you been to the ER, urgent care clinic since your last visit? No  Hospitalized since your last visit? No    2. Have you seen or consulted any other health care providers outside of the [address] [place] [address] since your last visit? Include any pap smears or colon screening.  No    Wt Readings from Last 3 Encounters:   02/03/21 220 lb 12.8 oz (100.2 kg)   09/30/20 214 lb 6.4 oz (97.3 kg)   03/10/20 223 lb (101.2 kg)     Temp Readings from Last 3 Encounters:   02/03/21 97.9 °F (36.6 °C) (Oral)   09/30/20 98 °F (36.7 °C) (Oral)   03/10/20 97.1 °F (36.2 °C) (Oral)     BP Readings from Last 3 Encounters:   02/03/21 124/84   09/30/20 (!) 125/90   03/10/20 101/56     Pulse Readings from Last 3 Encounters:   02/03/21 83   09/30/20 87   03/10/20 90

## 2021-02-03 NOTE — LETTER
2/3/2021 Patient: Joseph Hines YOB: 1970 Date of Visit: 2/3/2021 Laura Dyer MD 
[address] [place] [address] Via Fax: [phone] Dear Laura Dyer MD, Thank you for referring Ms. Joseph Hines to [facility]OLOG[facility] for evaluation. My notes for this consultation are attached. If you have questions, please do not hesitate to call me. I look forward to following your patient along with you. Sincerely, Denis Spain MD

## 2021-02-03 NOTE — PROGRESS NOTES
HISTORY OF PRESENT ILLNESS    Adeola Sands is a 48  y.o. female. HPI    Patient is here for  Follow up for hypothyroidism management and  Weight loss   After last visit  Sep           Review of Systems stitutional: weight gain   HENT: Negative. Eyes: Negative for pain and redness. Respiratory: Negative. Cardiovascular:  Negative for chest pain and leg swelling. Gastrointestinal: Negative. Negative for constipation.           Physical Exam   Constitutional: She is oriented to person, place, and time. She appears well-developed and well-nourished. HENT:   Head: Normocephalic. Eyes: Conjunctivae and EOM are normal. Pupils are equal, round, and reactive to light. Neck: Normal range of motion. Neck supple. No JVD present. No tracheal deviation present. No thyromegaly present. Cardiovascular: Normal rate, regular rhythm and normal heart sounds.           Lab Results   Component Value Date/Time    TSH 5.050 (H) 01/21/2021 09:43 AM    T4, Free 1.00 01/21/2021 09:43 AM                 ASSESSMENT and PLAN    1. hashimatos disease  :     SHE IS EUTHYROID ,  On BRAND synthrodi 88 mcg dose     Nov 2019  :  ADVISING TO SKIP A DAY IN A WEEK  On 88 mcg dose   = 75 mcg a day   TRIAL OF CYTOMEL 5 MCG  7 days  A DAY     March 2020  : TSH  - 0.17   Advising to skip Saturday and Sundays on  88 mcg a day  = 64 mcg a day   Stay on cytomel 5 mcg  A  day     Sept 2020  :  Labs are good , she is feeling well   synthrodi 88 mcg BMN 5 days a week   Cytomel  5 mcg a day      Feb 2021  :    TSH is over 5   Stay on synthroid    88 mcg BMN  5 days a week  and  Cytomel   5 mcg  A day   She definitely is  Not COMPLIANT WITH  MED INTAKE   Explained the compliance again   Will do labs in 2 months and 6 months       2. Weight gain / Body mass index is 37.9 kg/m². DISCUSSED  TLC   She tried  Q-symia   And finds it to be expensive   Will try lomaira  Tid         3.  Amenorrhea : on  IUD - advised her to get it off   Menopause Is in , explaining for weight gain        4.  Pre diabetes :5.9 % on labs  Discussed TLC      Reviewed results with patient and discussed the labs being ordered today/bnv  Patient voiced understanding of plan of care

## 2021-02-03 NOTE — PATIENT INSTRUCTIONS
SPECIFIC INSTRUCTIONS BELOW  
 
LOMAIRA  8 mg right before each meal  
 
 
 
Low carb diet No sodas   , salt restricted diet Count the carbs  And calories 
 
 
-------------------------------------------------------------------------------------------------------------------- Synthroid 88 mcg  A day, BRAND  Except  On saturdays  And  Sundays,  on empty stomach with water only, no other meds or food or drinks   For next half hour  ( [facility] ) Plus   Cytomel 5 mcg a day Take any kind of vitamins, calcium, iron   Pills  4 hours later 
 
 
---------------------------------------------------------------------------------------------------------------------------------- 
 
PAY ATTENTION TO THESE GENERAL INSTRUCTIONS  
 
- HEALTH MAINTENANCE IS NOT GOING TO BE UP TO DATE ON YOUR AVS- PLEASE IGNORE  
- YOUR MED LIST IS NOT UP TO DATE AS SOME CHANGES ARE BEING MADE AFTER THE VISIT - FOLLOW SPECIFIC INSTRUCTIONS ABOVE Results *Normal results will not be notified by a phone call starting January 1 2021 *If you have an upcoming visit, the results will be discussed at the visit *Please sign up for MY CHART if you want access to your lab and test results *Abnormal results which require immediate attention will be notified by phone call *Abnormal results which do not require immediate assistance will be notified in 1-2 weeks Refills    -    have your pharmacy send us a refill request 
Phone calls  -  Allow  24 hrs. for non-urgent calls to be returned Prior authorization - It may take 2-4 weeks to process Forms  -  FMLA, DMV etc., will take up to 2 weeks to process Cancellations - please notify the office 2 days in advance Samples  - will only be dispensed at visits  
 
--------------------------------------------------------------------------------------------

## 2021-02-05 RX ORDER — LIOTHYRONINE SODIUM 5 UG/1
5 TABLET ORAL DAILY
Qty: 90 TAB | Refills: 4 | Status: SHIPPED
Start: 2021-02-05 | End: 2021-05-14 | Stop reason: SINTOL

## 2021-02-05 NOTE — TELEPHONE ENCOUNTER
Spoke with pt, reminded new Rx [facility]) was mailed to home address as pt forgot it when she left and Cytomel being sent to [facility].

## 2021-02-05 NOTE — TELEPHONE ENCOUNTER
Patient was in office for visit stating medications were not sent in. She states she was to get a new medication and medication that goes with her synthroid on the weekends did not know the names.  Confirmed pharmacy on file as [facility]

## 2021-03-31 ENCOUNTER — HOSPITAL ENCOUNTER (OUTPATIENT)
Dept: LAB | Age: 51
Discharge: HOME | End: 2021-03-31
Payer: COMMERCIAL

## 2021-03-31 PROCEDURE — 84481 FREE ASSAY (FT-3): CPT

## 2021-03-31 PROCEDURE — 84443 ASSAY THYROID STIM HORMONE: CPT

## 2021-03-31 PROCEDURE — 36415 COLL VENOUS BLD VENIPUNCTURE: CPT

## 2021-03-31 PROCEDURE — 84439 ASSAY OF FREE THYROXINE: CPT

## 2021-04-01 LAB
T3FREE SERPL-MCNC: 3.5 PG/ML (ref 2–4.4)
T4 FREE SERPL-MCNC: 0.79 NG/DL (ref 0.82–1.77)
TSH SERPL DL<=0.005 MIU/L-ACNC: 7.99 UIU/ML (ref 0.45–4.5)

## 2021-04-05 DIAGNOSIS — E06.3 HASHIMOTO'S DISEASE: Primary | ICD-10-CM

## 2021-04-05 NOTE — PROGRESS NOTES
She claims compliance with her thyroid meds   Since she met me 2 months ago  So, I asked her to come by in 10th  May to get labs drawn in office before dose is adusted . Ordered labs.  Notify pt lab appt   Dr. Mirian Elizondo

## 2021-04-23 RX ORDER — LEVOTHYROXINE SODIUM 88 UG/1
TABLET ORAL
Qty: 66 TAB | Refills: 4 | Status: SHIPPED | OUTPATIENT
Start: 2021-04-23 | End: 2021-05-14 | Stop reason: SDUPTHER

## 2021-05-13 ENCOUNTER — TELEPHONE (OUTPATIENT)
Dept: ENDOCRINOLOGY | Age: 51
End: 2021-05-13

## 2021-05-13 ENCOUNTER — DOCUMENTATION ONLY (OUTPATIENT)
Dept: ENDOCRINOLOGY | Age: 51
End: 2021-05-13

## 2021-05-13 NOTE — TELEPHONE ENCOUNTER
Returned you call to Dr. Erum Peralta unsure who is working tomorrow sending to [employer] but please forward to nurse tomorrow thank you

## 2021-05-14 ENCOUNTER — DOCUMENTATION ONLY (OUTPATIENT)
Dept: ENDOCRINOLOGY | Age: 51
End: 2021-05-14

## 2021-05-14 RX ORDER — LEVOTHYROXINE SODIUM 88 UG/1
TABLET ORAL
Qty: 90 TAB | Refills: 3 | Status: SHIPPED | OUTPATIENT
Start: 2021-05-14

## 2021-05-14 NOTE — PROGRESS NOTES
I spoke to the patient and figured out that she has not been taking Cytomel for atleast 2 weeks     She first forgot to take cytomel going to a tournament and felt she was not achy any more and she continued to not take it   She feels great without cytomel     So, I asked her to stay off cytomel and increase synthroid to 88 mcg daily  ( from 5 days a week )     She has cost issues there   And I assured her that I will address that with her at next visit     Starla Kinsey MD

## 2023-05-11 RX ORDER — IBUPROFEN 200 MG
TABLET ORAL EVERY 8 HOURS PRN
COMMUNITY

## 2023-05-11 RX ORDER — LEVOTHYROXINE SODIUM 88 UG/1
TABLET ORAL
COMMUNITY
Start: 2021-05-14

## 2023-05-11 RX ORDER — ESCITALOPRAM OXALATE 20 MG/1
20 TABLET ORAL DAILY
COMMUNITY

## 2024-03-19 ENCOUNTER — HOSPITAL ENCOUNTER (OUTPATIENT)
Facility: HOSPITAL | Age: 54
Discharge: HOME | End: 2024-03-22
Payer: COMMERCIAL

## 2024-03-19 DIAGNOSIS — K80.50 CHOLEDOCHOLITHIASIS: ICD-10-CM

## 2024-03-19 LAB — CREAT BLD-MCNC: 0.7 MG/DL (ref 0.6–1.3)

## 2024-03-19 PROCEDURE — 6360000004 HC RX CONTRAST MEDICATION: Performed by: INTERNAL MEDICINE

## 2024-03-19 PROCEDURE — 82565 ASSAY OF CREATININE: CPT

## 2024-03-19 PROCEDURE — A9577 INJ MULTIHANCE: HCPCS | Performed by: INTERNAL MEDICINE

## 2024-03-19 PROCEDURE — 74183 MRI ABD W/O CNTR FLWD CNTR: CPT

## 2024-03-19 RX ADMIN — GADOBENATE DIMEGLUMINE 20 ML: 529 INJECTION, SOLUTION INTRAVENOUS at 10:40
